# Patient Record
Sex: MALE | Race: WHITE | NOT HISPANIC OR LATINO | Employment: OTHER | URBAN - METROPOLITAN AREA
[De-identification: names, ages, dates, MRNs, and addresses within clinical notes are randomized per-mention and may not be internally consistent; named-entity substitution may affect disease eponyms.]

---

## 2023-03-07 ENCOUNTER — OFFICE VISIT (OUTPATIENT)
Dept: CARDIOLOGY | Facility: CLINIC | Age: 68
End: 2023-03-07
Payer: MEDICARE

## 2023-03-07 VITALS
OXYGEN SATURATION: 96 % | HEIGHT: 72 IN | WEIGHT: 195 LBS | BODY MASS INDEX: 26.41 KG/M2 | SYSTOLIC BLOOD PRESSURE: 122 MMHG | HEART RATE: 64 BPM | DIASTOLIC BLOOD PRESSURE: 76 MMHG

## 2023-03-07 DIAGNOSIS — I25.810 CORONARY ARTERY DISEASE INVOLVING CORONARY BYPASS GRAFT OF NATIVE HEART WITHOUT ANGINA PECTORIS: Primary | ICD-10-CM

## 2023-03-07 DIAGNOSIS — R06.02 SHORTNESS OF BREATH: ICD-10-CM

## 2023-03-07 DIAGNOSIS — E78.2 MIXED HYPERLIPIDEMIA: ICD-10-CM

## 2023-03-07 PROCEDURE — 99204 OFFICE O/P NEW MOD 45 MIN: CPT | Performed by: INTERNAL MEDICINE

## 2023-03-07 PROCEDURE — 93000 ELECTROCARDIOGRAM COMPLETE: CPT | Performed by: INTERNAL MEDICINE

## 2023-03-07 RX ORDER — MOMETASONE FUROATE 1 MG/ML
SOLUTION TOPICAL AS NEEDED
COMMUNITY

## 2023-03-07 RX ORDER — CITALOPRAM 40 MG/1
TABLET ORAL DAILY
COMMUNITY

## 2023-03-07 RX ORDER — OMEPRAZOLE 20 MG/1
CAPSULE, DELAYED RELEASE ORAL EVERY 24 HOURS
COMMUNITY

## 2023-03-07 RX ORDER — ATORVASTATIN CALCIUM 80 MG/1
TABLET, FILM COATED ORAL DAILY
COMMUNITY
Start: 2022-11-28 | End: 2023-03-07 | Stop reason: SDUPTHER

## 2023-03-07 RX ORDER — KETOCONAZOLE 20 MG/G
CREAM TOPICAL AS NEEDED
COMMUNITY

## 2023-03-07 RX ORDER — ATORVASTATIN CALCIUM 80 MG/1
80 TABLET, FILM COATED ORAL DAILY
Qty: 90 TABLET | Refills: 4 | Status: SHIPPED | OUTPATIENT
Start: 2023-03-07

## 2023-03-07 RX ORDER — CITALOPRAM 20 MG/1
TABLET ORAL DAILY
COMMUNITY

## 2023-03-07 RX ORDER — ASPIRIN 81 MG/1
TABLET ORAL EVERY 24 HOURS
COMMUNITY

## 2023-03-07 NOTE — PROGRESS NOTES
[facility] Cardiology  Consultation H&P  Chava Ritchie  1955  [phone]  There is no work phone number on file..    VISIT DATE:  03/07/2023    PCP: Vinicius King MD  [facility]  [facility] [address]    CC:  Chief Complaint   Patient presents with   • Atherosclerotic Heart Disease of Native CAD without angina        Previous cardiac studies and procedures:  June 2018: PCI distal LAD/first diagonal 2.25 x 16 and 3.5 x 22 Synergy.    September 2018 myocardial perfusion imaging: Mildly depressed EF.  Large infarct, territory unspecified.    June 2019  TTE: Normal EF.  Mild diastolic dysfunction.  Borderline LVH.  Mild MR.  Mild to moderate TR.  Normal PASP.  Aortic sclerosis.    ASSESSMENT:   Diagnosis Plan   1. Coronary artery disease involving coronary bypass graft of native heart without angina pectoris        2. Mixed hyperlipidemia              PLAN:  Coronary artery disease: Currently stable and asymptomatic.  Continue aspirin and high intensity statin therapy.  Echo pending to assess underlying LV systolic function.  If echo does not reveal any significant regions of scar and preserved EF then we will likely be able to wean off beta-blockade gradually.    Hyperlipidemia: Goal LDL less than 70.  Continue atorvastatin 80 mg p.o. daily.  Predominant plant-based diet.    History of Present Illness   67-year-old gentleman with a history of ST elevation myocardial infarction in 2018 treated with drug-eluting stenting.  Presented with atypical chest pain approximately 3 days into event.  No recurrence.  Blood pressures run less than 130/80 mmHg.  He is compliant with medical therapy.  Reports a relatively heart healthy diet.  Has intermittent shortness of breath and class II pattern.    PHYSICAL EXAMINATION:  Vitals:    03/07/23 1006   BP: 122/76   BP Location: Right arm   Patient Position: Sitting   Cuff Size: Adult   Pulse: 64   SpO2: 96%  "  Weight: 88.5 kg (195 lb)   Height: 182.9 cm (72\")     General Appearance:    Alert, cooperative, no distress, appears stated age   Head:    Normocephalic, without obvious abnormality, atraumatic   Eyes:    conjunctiva/corneas clear, EOM's intact, fundi     benign, both eyes   Ears:    Normal TM's and external ear canals, both ears   Nose:   Nares normal, septum midline, mucosa normal, no drainage    or sinus tenderness   Throat:   Lips, mucosa, and tongue normal; teeth and gums normal   Neck:   Supple, symmetrical, trachea midline, no adenopathy;     thyroid:  no enlargement/tenderness/nodules; no carotid    bruit or JVD   Back:     Symmetric, no curvature, ROM normal, no CVA tenderness   Lungs:     Clear to auscultation bilaterally, respirations unlabored   Chest Wall:    No tenderness or deformity    Heart:    Regular rate and rhythm, S1 and S2 normal, no murmur, rub   or gallop, normal carotid impulse bilaterally without bruit.   Abdomen:     Soft, non-tender, bowel sounds active all four quadrants,     no masses, no organomegaly   Extremities:   Extremities normal, atraumatic, no cyanosis or edema   Pulses:   2+ and symmetric all extremities   Skin:   Skin color, texture, turgor normal, no rashes or lesions   Lymph nodes:   Cervical, supraclavicular, and axillary nodes normal   Neurologic:   normal strength, sensation intact     throughout       Diagnostic Data:    ECG 12 Lead    Date/Time: 3/7/2023 10:21 AM  Performed by: Maximiliano Villagran III, MD  Authorized by: Maximiliano Villagran III, MD   Previous ECG: no previous ECG available  Rhythm: sinus rhythm  Conduction: left anterior fascicular block    Clinical impression: abnormal EKG          No results found for: CHLPL, TRIG, HDL, LDLDIRECT  No results found for: GLUCOSE, BUN, CREATININE, NA, K, CL, CO2, CREATININE, BUN  No results found for: HGBA1C  No results found for: WBC, HGB, HCT, PLT    PROBLEM LIST:  Patient Active Problem List   Diagnosis   • Coronary artery " disease involving coronary bypass graft of native heart without angina pectoris   • Mixed hyperlipidemia       PAST MEDICAL HX  Past Medical History:   Diagnosis Date   • Myocardial infarction (HCC) 2019    2 stints       Allergies  No Known Allergies    Current Medications    Current Outpatient Medications:   •  aspirin 81 MG EC tablet, Daily., Disp: , Rfl:   •  atorvastatin (LIPITOR) 80 MG tablet, Daily., Disp: , Rfl:   •  citalopram (CeleXA) 20 MG tablet, Daily., Disp: , Rfl:   •  citalopram (CeleXA) 40 MG tablet, Daily., Disp: , Rfl:   •  hydrocortisone 2.5 % cream, As Needed., Disp: , Rfl:   •  ketoconazole (NIZORAL) 2 % cream, As Needed., Disp: , Rfl:   •  Metoprolol Succinate 50 MG capsule extended-release 24 hour sprinkle, Daily., Disp: , Rfl:   •  mometasone (ELOCON) 0.1 % solution, As Needed., Disp: , Rfl:   •  omeprazole (priLOSEC) 20 MG capsule, Daily., Disp: , Rfl:          ROS  ROS      SOCIAL HX  Social History     Socioeconomic History   • Marital status:    Tobacco Use   • Smoking status: Former     Packs/day: 1.00     Years: 15.00     Pack years: 15.00     Types: Cigarettes     Quit date:      Years since quittin.1   • Smokeless tobacco: Never   • Tobacco comments:     Quit 18 years ago   Vaping Use   • Vaping Use: Never used   Substance and Sexual Activity   • Alcohol use: Yes     Alcohol/week: 6.0 standard drinks     Types: 2 Glasses of wine, 4 Cans of beer per week     Comment: Social drinker   • Drug use: Yes     Types: Marijuana   • Sexual activity: Not Currently     Partners: Male     Birth control/protection: None       FAMILY HX  Family History   Problem Relation Age of Onset   • Heart attack Brother    • No Known Problems Maternal Grandmother    • No Known Problems Maternal Grandfather    • No Known Problems Paternal Grandmother    • No Known Problems Paternal Grandfather              Maximiliano Villagran III, MD, [facility]

## 2023-04-19 ENCOUNTER — HOSPITAL ENCOUNTER (OUTPATIENT)
Dept: CARDIOLOGY | Facility: HOSPITAL | Age: 68
Discharge: HOME | End: 2023-04-19
Admitting: INTERNAL MEDICINE
Payer: MEDICARE

## 2023-04-19 VITALS — WEIGHT: 195 LBS | BODY MASS INDEX: 26.41 KG/M2 | HEIGHT: 72 IN

## 2023-04-19 DIAGNOSIS — R06.02 SHORTNESS OF BREATH: ICD-10-CM

## 2023-04-19 LAB
BH CV ECHO MEAS - AO MAX PG: 4.2 MMHG
BH CV ECHO MEAS - AO MEAN PG: 2.7 MMHG
BH CV ECHO MEAS - AO ROOT DIAM: 3 CM
BH CV ECHO MEAS - AO V2 MAX: 102.9 CM/SEC
BH CV ECHO MEAS - AO V2 VTI: 22.9 CM
BH CV ECHO MEAS - AVA(I,D): 2.09 CM2
BH CV ECHO MEAS - EDV(CUBED): 122.7 ML
BH CV ECHO MEAS - EDV(MOD-SP2): 113 ML
BH CV ECHO MEAS - EDV(MOD-SP4): 129 ML
BH CV ECHO MEAS - EF(MOD-BP): 50 %
BH CV ECHO MEAS - EF(MOD-SP2): 54.9 %
BH CV ECHO MEAS - EF(MOD-SP4): 50.4 %
BH CV ECHO MEAS - ESV(CUBED): 34.4 ML
BH CV ECHO MEAS - ESV(MOD-SP2): 51 ML
BH CV ECHO MEAS - ESV(MOD-SP4): 64 ML
BH CV ECHO MEAS - FS: 34.5 %
BH CV ECHO MEAS - IVS/LVPW: 0.92 CM
BH CV ECHO MEAS - IVSD: 1.02 CM
BH CV ECHO MEAS - LA DIMENSION: 4 CM
BH CV ECHO MEAS - LV DIASTOLIC VOL/BSA (35-75): 61.2 CM2
BH CV ECHO MEAS - LV MASS(C)D: 196.2 GRAMS
BH CV ECHO MEAS - LV MAX PG: 2.8 MMHG
BH CV ECHO MEAS - LV MEAN PG: 1.15 MMHG
BH CV ECHO MEAS - LV SYSTOLIC VOL/BSA (12-30): 30.4 CM2
BH CV ECHO MEAS - LV V1 MAX: 83.4 CM/SEC
BH CV ECHO MEAS - LV V1 VTI: 15.7 CM
BH CV ECHO MEAS - LVIDD: 5 CM
BH CV ECHO MEAS - LVIDS: 3.3 CM
BH CV ECHO MEAS - LVOT AREA: 3 CM2
BH CV ECHO MEAS - LVOT DIAM: 1.97 CM
BH CV ECHO MEAS - LVPWD: 1.11 CM
BH CV ECHO MEAS - MV A MAX VEL: 93.8 CM/SEC
BH CV ECHO MEAS - MV DEC SLOPE: 302.8 CM/SEC2
BH CV ECHO MEAS - MV DEC TIME: 0.29 MSEC
BH CV ECHO MEAS - MV E MAX VEL: 74 CM/SEC
BH CV ECHO MEAS - MV E/A: 0.79
BH CV ECHO MEAS - MV MAX PG: 5.6 MMHG
BH CV ECHO MEAS - MV MEAN PG: 2.39 MMHG
BH CV ECHO MEAS - MV P1/2T: 116.1 MSEC
BH CV ECHO MEAS - MV V2 VTI: 43 CM
BH CV ECHO MEAS - MVA(P1/2T): 1.89 CM2
BH CV ECHO MEAS - MVA(VTI): 1.11 CM2
BH CV ECHO MEAS - PA ACC SLOPE: 323 CM/SEC2
BH CV ECHO MEAS - PA ACC TIME: 0.16 SEC
BH CV ECHO MEAS - PA PR(ACCEL): 6.1 MMHG
BH CV ECHO MEAS - RAP SYSTOLE: 3 MMHG
BH CV ECHO MEAS - RVSP: 21 MMHG
BH CV ECHO MEAS - SI(MOD-SP2): 29.4 ML/M2
BH CV ECHO MEAS - SI(MOD-SP4): 30.8 ML/M2
BH CV ECHO MEAS - SV(LVOT): 47.7 ML
BH CV ECHO MEAS - SV(MOD-SP2): 62 ML
BH CV ECHO MEAS - SV(MOD-SP4): 65 ML
BH CV ECHO MEAS - TAPSE (>1.6): 2.3 CM
BH CV ECHO MEAS - TR MAX PG: 17.8 MMHG
BH CV ECHO MEAS - TR MAX VEL: 208.3 CM/SEC
BH CV VAS BP RIGHT ARM: NORMAL MMHG
BH CV XLRA - RV BASE: 3.6 CM
BH CV XLRA - RV LENGTH: 6.4 CM
BH CV XLRA - RV MID: 2.7 CM
BH CV XLRA - TDI S': 10.5 CM/SEC
LEFT ATRIUM VOLUME INDEX: 19.9 ML/M2
MAXIMAL PREDICTED HEART RATE: 151 BPM
STRESS TARGET HR: 128 BPM

## 2023-04-19 PROCEDURE — 93306 TTE W/DOPPLER COMPLETE: CPT | Performed by: INTERNAL MEDICINE

## 2023-04-19 PROCEDURE — 93306 TTE W/DOPPLER COMPLETE: CPT

## 2023-04-21 ENCOUNTER — TELEPHONE (OUTPATIENT)
Dept: CARDIOLOGY | Facility: CLINIC | Age: 68
End: 2023-04-21
Payer: MEDICARE

## 2023-04-21 NOTE — TELEPHONE ENCOUNTER
----- Message from Maximiliano Villagran III, MD sent at 4/21/2023  9:27 AM EDT -----  Strength of heart muscle normal.  Decrease metoprolol succinate to 25 mg p.o. daily.

## 2023-09-02 RX ORDER — METOPROLOL SUCCINATE 25 MG/1
TABLET, EXTENDED RELEASE ORAL
Qty: 30 TABLET | Refills: 5 | Status: SHIPPED | OUTPATIENT
Start: 2023-09-02

## 2024-03-12 ENCOUNTER — OFFICE VISIT (OUTPATIENT)
Dept: CARDIOLOGY | Facility: CLINIC | Age: 69
End: 2024-03-12
Payer: MEDICARE

## 2024-03-12 VITALS
HEIGHT: 72 IN | OXYGEN SATURATION: 98 % | BODY MASS INDEX: 26.14 KG/M2 | SYSTOLIC BLOOD PRESSURE: 124 MMHG | DIASTOLIC BLOOD PRESSURE: 68 MMHG | HEART RATE: 74 BPM | WEIGHT: 193 LBS

## 2024-03-12 DIAGNOSIS — I25.810 CORONARY ARTERY DISEASE INVOLVING CORONARY BYPASS GRAFT OF NATIVE HEART WITHOUT ANGINA PECTORIS: Primary | ICD-10-CM

## 2024-03-12 DIAGNOSIS — E78.2 MIXED HYPERLIPIDEMIA: ICD-10-CM

## 2024-03-12 PROCEDURE — 99214 OFFICE O/P EST MOD 30 MIN: CPT | Performed by: INTERNAL MEDICINE

## 2024-03-12 RX ORDER — ATORVASTATIN CALCIUM 80 MG/1
80 TABLET, FILM COATED ORAL DAILY
Qty: 90 TABLET | Refills: 4 | Status: SHIPPED | OUTPATIENT
Start: 2024-03-12

## 2024-03-12 RX ORDER — MULTIVIT WITH MINERALS/LUTEIN
1000 TABLET ORAL DAILY
COMMUNITY

## 2024-03-12 RX ORDER — DIPHENOXYLATE HYDROCHLORIDE AND ATROPINE SULFATE 2.5; .025 MG/1; MG/1
1 TABLET ORAL DAILY
COMMUNITY

## 2024-03-12 RX ORDER — METOPROLOL SUCCINATE 25 MG/1
25 TABLET, EXTENDED RELEASE ORAL DAILY
Qty: 90 TABLET | Refills: 3 | Status: SHIPPED | OUTPATIENT
Start: 2024-03-12

## 2024-03-12 NOTE — PROGRESS NOTES
[facility] Cardiology  Office visit  Chava Ritchie  1955  [phone]  There is no work phone number on file.    VISIT DATE:  3/12/2024    PCP: Ad Basurto MD  [address] [address]    CC:  Chief Complaint   Patient presents with    Coronary Artery Disease       Previous cardiac studies and procedures:  une 2018: PCI distal LAD/first diagonal 2.25 x 16 and 3.5 x 22 Synergy.    September 2018 myocardial perfusion imaging: Mildly depressed EF.  Large infarct, territory unspecified.    June 2019  TTE: Normal EF.  Mild diastolic dysfunction.  Borderline LVH.  Mild MR.  Mild to moderate TR.  Normal PASP.  Aortic sclerosis.    April 2023 TTE    Left ventricular systolic function is normal. Calculated left ventricular EF = 50% Left ventricular ejection fraction appears to be 56 - 60%.    Left ventricular diastolic function is consistent with (grade I) impaired relaxation.    Estimated right ventricular systolic pressure from tricuspid regurgitation is normal (<35 mmHg).    ASSESSMENT:   Diagnosis Plan   1. Coronary artery disease involving coronary bypass graft of native heart without angina pectoris        2. Mixed hyperlipidemia            PLAN:  Coronary artery disease: Currently stable and asymptomatic.  Continue aspirin, low-dose beta-blockade and high intensity statin therapy.       Hyperlipidemia: Goal LDL less than 70.  Continue atorvastatin 80 mg p.o. daily.  Predominant plant-based diet.  Well-controlled.    Subjective  Interval assessment: No change in baseline function capacity.  Denies chest pain, palpitations or dyspnea.  Blood pressures running less than 130/80 mmHg.    Initial evaluation: 67-year-old gentleman with a history of ST elevation myocardial infarction in 2018 treated with drug-eluting stenting. Presented with atypical chest pain approximately 3 days into event. No recurrence. Blood pressures run less than 130/80 mmHg. He is compliant  "with medical therapy. Reports a relatively heart healthy diet. Has intermittent shortness of breath and class II pattern.     PHYSICAL EXAMINATION:  Vitals:    03/12/24 1018   BP: 124/68   BP Location: Right arm   Patient Position: Sitting   Pulse: 74   SpO2: 98%   Weight: 87.5 kg (193 lb)   Height: 182.9 cm (72\")     General Appearance:    Alert, cooperative, no distress, appears stated age   Head:    Normocephalic, without obvious abnormality, atraumatic   Eyes:    conjunctiva/corneas clear   Nose:   Nares normal, septum midline, mucosa normal, no drainage   Throat:   Lips, teeth and gums normal   Neck:   Supple, symmetrical, trachea midline, no carotid    bruit or JVD   Lungs:     Clear to auscultation bilaterally, respirations unlabored   Chest Wall:    No tenderness or deformity    Heart:    Regular rate and rhythm, S1 and S2 normal, no murmur, rub   or gallop, normal carotid impulse bilaterally without bruit.   Abdomen:     Soft, non-tender   Extremities:   Extremities normal, atraumatic, no cyanosis or edema   Pulses:   2+ and symmetric all extremities   Skin:   Skin color, texture, turgor normal, no rashes or lesions       Diagnostic Data:  Procedures  No results found for: \"CHLPL\", \"TRIG\", \"HDL\", \"LDLDIRECT\"  No results found for: \"GLUCOSE\", \"BUN\", \"CREATININE\", \"NA\", \"K\", \"CL\", \"CO2\"  No results found for: \"HGBA1C\"  No results found for: \"WBC\", \"HGB\", \"HCT\", \"PLT\"    Allergies  No Known Allergies    Current Medications    Current Outpatient Medications:     ascorbic acid (VITAMIN C) 1000 MG tablet, Take 1 tablet by mouth Daily., Disp: , Rfl:     aspirin 81 MG EC tablet, Daily., Disp: , Rfl:     atorvastatin (LIPITOR) 80 MG tablet, Take 1 tablet by mouth Daily., Disp: 90 tablet, Rfl: 4    B Complex Vitamins (VITAMIN B COMPLEX PO), Take  by mouth Daily., Disp: , Rfl:     citalopram (CeleXA) 20 MG tablet, 4 (Four) Times a Week., Disp: , Rfl:     citalopram (CeleXA) 40 MG tablet, 3 (Three) Times a Week., Disp: " , Rfl:     hydrocortisone 2.5 % cream, As Needed., Disp: , Rfl:     ketoconazole (NIZORAL) 2 % cream, As Needed., Disp: , Rfl:     metoprolol succinate XL (TOPROL-XL) 25 MG 24 hr tablet, TAKE ONE TABLET BY MOUTH ONE TIME DAILY, Disp: 30 tablet, Rfl: 5    mometasone (ELOCON) 0.1 % solution, As Needed., Disp: , Rfl:     multivitamin (THERAGRAN) tablet tablet, Take 1 tablet by mouth Daily., Disp: , Rfl:     omeprazole (priLOSEC) 20 MG capsule, Daily., Disp: , Rfl:     VITAMIN D, CHOLECALCIFEROL, PO, Take 1 tablet by mouth Daily., Disp: , Rfl:           ROS  ROS      SOCIAL HX  Social History     Socioeconomic History    Marital status:    Tobacco Use    Smoking status: Former     Current packs/day: 0.00     Average packs/day: 1 pack/day for 15.0 years (15.0 ttl pk-yrs)     Types: Cigarettes     Start date: 1990     Quit date:      Years since quittin.2    Smokeless tobacco: Never    Tobacco comments:     Quit 18 years ago   Vaping Use    Vaping status: Never Used   Substance and Sexual Activity    Alcohol use: Yes     Alcohol/week: 6.0 standard drinks of alcohol     Types: 2 Glasses of wine, 4 Cans of beer per week     Comment: Social drinker    Drug use: Yes     Types: Marijuana    Sexual activity: Not Currently     Partners: Male     Birth control/protection: None       FAMILY HX  Family History   Problem Relation Age of Onset    Heart attack Brother     No Known Problems Maternal Grandmother     No Known Problems Maternal Grandfather     No Known Problems Paternal Grandmother     No Known Problems Paternal Grandfather              Maximiliano Villagran III, MD, [facility]

## 2024-04-16 ENCOUNTER — TELEPHONE (OUTPATIENT)
Dept: CARDIOLOGY | Facility: CLINIC | Age: 69
End: 2024-04-16

## 2024-04-16 NOTE — TELEPHONE ENCOUNTER
SPOKE W/PATIENT REGARDING LOCATION CHANGE TO [address] [place] [address]  4/16/24  HUB [facility]CH[facility] TO SCHEDULE

## 2025-03-25 ENCOUNTER — OFFICE VISIT (OUTPATIENT)
Dept: CARDIOLOGY | Facility: CLINIC | Age: 70
End: 2025-03-25
Payer: MEDICARE

## 2025-03-25 VITALS
HEART RATE: 70 BPM | OXYGEN SATURATION: 97 % | BODY MASS INDEX: 25.33 KG/M2 | WEIGHT: 187 LBS | DIASTOLIC BLOOD PRESSURE: 62 MMHG | HEIGHT: 72 IN | SYSTOLIC BLOOD PRESSURE: 118 MMHG

## 2025-03-25 DIAGNOSIS — I25.810 CORONARY ARTERY DISEASE INVOLVING CORONARY BYPASS GRAFT OF NATIVE HEART WITHOUT ANGINA PECTORIS: Primary | ICD-10-CM

## 2025-03-25 DIAGNOSIS — E78.2 MIXED HYPERLIPIDEMIA: ICD-10-CM

## 2025-03-25 PROCEDURE — 99214 OFFICE O/P EST MOD 30 MIN: CPT | Performed by: INTERNAL MEDICINE

## 2025-03-25 PROCEDURE — 93000 ELECTROCARDIOGRAM COMPLETE: CPT | Performed by: INTERNAL MEDICINE

## 2025-03-25 RX ORDER — METOPROLOL SUCCINATE 25 MG/1
25 TABLET, EXTENDED RELEASE ORAL DAILY
Qty: 90 TABLET | Refills: 3 | Status: SHIPPED | OUTPATIENT
Start: 2025-03-25

## 2025-03-25 RX ORDER — ATORVASTATIN CALCIUM 80 MG/1
80 TABLET, FILM COATED ORAL DAILY
Qty: 90 TABLET | Refills: 4 | Status: SHIPPED | OUTPATIENT
Start: 2025-03-25

## 2025-03-25 NOTE — PROGRESS NOTES
[facility] Cardiology  Office visit  Chava Ritchie  1955  [phone]  There is no work phone number on file.    VISIT DATE:  3/25/2025    PCP: Ad Basurto MD  [address] [address]    CC:  Chief Complaint   Patient presents with    Coronary Artery Disease     1 year follow up       Previous cardiac studies and procedures:  une 2018: PCI distal LAD/first diagonal 2.25 x 16 and 3.5 x 22 Synergy.    September 2018 myocardial perfusion imaging: Mildly depressed EF.  Large infarct, territory unspecified.    June 2019  TTE: Normal EF.  Mild diastolic dysfunction.  Borderline LVH.  Mild MR.  Mild to moderate TR.  Normal PASP.  Aortic sclerosis.    April 2023 TTE    Left ventricular systolic function is normal. Calculated left ventricular EF = 50% Left ventricular ejection fraction appears to be 56 - 60%.    Left ventricular diastolic function is consistent with (grade I) impaired relaxation.    Estimated right ventricular systolic pressure from tricuspid regurgitation is normal (<35 mmHg).    ASSESSMENT:   Diagnosis Plan   1. Coronary artery disease involving coronary bypass graft of native heart without angina pectoris        2. Mixed hyperlipidemia            PLAN:  Coronary artery disease: Currently stable and asymptomatic.  Continue aspirin, low-dose beta-blockade and high intensity statin therapy.       Hyperlipidemia: Goal LDL less than 70.  Continue atorvastatin 80 mg p.o. daily.  Predominant plant-based diet.  Well-controlled.    Diabetes mellitus: Most recent hemoglobin A1c 6.6.  Continue dietary modifications and regular exercise.  Continue routine follow-up with primary care physician for continued optimization of control.    Subjective  Interval assessment: No change in baseline function capacity.  Still exercising at the gym on a regular basis without difficulty.  Denies chest pain, palpitations or dyspnea.  Blood pressures running less than  "130/80 mmHg.    Initial evaluation: 67-year-old gentleman with a history of ST elevation myocardial infarction in 2018 treated with drug-eluting stenting. Presented with atypical chest pain approximately 3 days into event. No recurrence. Blood pressures run less than 130/80 mmHg. He is compliant with medical therapy. Reports a relatively heart healthy diet. Has intermittent shortness of breath and class II pattern.     PHYSICAL EXAMINATION:  Vitals:    03/25/25 0956   BP: 118/62   BP Location: Right arm   Patient Position: Sitting   Cuff Size: Adult   Pulse: 70   SpO2: 97%   Weight: 84.8 kg (187 lb)   Height: 182.9 cm (72\")     General Appearance:    Alert, cooperative, no distress, appears stated age   Head:    Normocephalic, without obvious abnormality, atraumatic   Eyes:    conjunctiva/corneas clear   Nose:   Nares normal, septum midline, mucosa normal, no drainage   Throat:   Lips, teeth and gums normal   Neck:   Supple, symmetrical, trachea midline, no carotid    bruit or JVD   Lungs:     Clear to auscultation bilaterally, respirations unlabored   Chest Wall:    No tenderness or deformity    Heart:    Regular rate and rhythm, S1 and S2 normal, no murmur, rub   or gallop, normal carotid impulse bilaterally without bruit.   Abdomen:     Soft, non-tender   Extremities:   Extremities normal, atraumatic, no cyanosis or edema   Pulses:   2+ and symmetric all extremities   Skin:   Skin color, texture, turgor normal, no rashes or lesions       Diagnostic Data:    ECG 12 Lead    Date/Time: 3/25/2025 10:17 AM  Performed by: Maximiliano Villagran III, MD    Authorized by: Maximiliano Villagran III, MD  Comparison: compared with previous ECG from 3/7/2023  Similar to previous ECG  Rhythm: sinus rhythm  Conduction: left anterior fascicular block    Clinical impression: abnormal EKG        No results found for: \"CHLPL\", \"TRIG\", \"HDL\", \"LDLDIRECT\"  No results found for: \"GLUCOSE\", \"BUN\", \"CREATININE\", \"NA\", \"K\", \"CL\", \"CO2\"  No results found " "for: \"HGBA1C\"  No results found for: \"WBC\", \"HGB\", \"HCT\", \"PLT\"    Allergies  No Known Allergies    Current Medications    Current Outpatient Medications:     ascorbic acid (VITAMIN C) 1000 MG tablet, Take 1 tablet by mouth Daily., Disp: , Rfl:     aspirin 81 MG EC tablet, Daily., Disp: , Rfl:     atorvastatin (LIPITOR) 80 MG tablet, Take 1 tablet by mouth Daily., Disp: 90 tablet, Rfl: 4    B Complex Vitamins (VITAMIN B COMPLEX PO), Take  by mouth Daily., Disp: , Rfl:     citalopram (CeleXA) 20 MG tablet, 4 (Four) Times a Week., Disp: , Rfl:     citalopram (CeleXA) 40 MG tablet, 3 (Three) Times a Week., Disp: , Rfl:     ketoconazole (NIZORAL) 2 % cream, As Needed., Disp: , Rfl:     metFORMIN (GLUCOPHAGE) 500 MG tablet, Take 1 tablet by mouth Daily With Breakfast., Disp: , Rfl:     metoprolol succinate XL (TOPROL-XL) 25 MG 24 hr tablet, Take 1 tablet by mouth Daily., Disp: 90 tablet, Rfl: 3    mometasone (ELOCON) 0.1 % solution, As Needed., Disp: , Rfl:     multivitamin (THERAGRAN) tablet tablet, Take 1 tablet by mouth Daily., Disp: , Rfl:     omeprazole (priLOSEC) 20 MG capsule, Daily., Disp: , Rfl:     VITAMIN D, CHOLECALCIFEROL, PO, Take 1 tablet by mouth Daily., Disp: , Rfl:           ROS  ROS      SOCIAL HX  Social History     Socioeconomic History    Marital status:    Tobacco Use    Smoking status: Former     Current packs/day: 0.00     Average packs/day: 1 pack/day for 15.0 years (15.0 ttl pk-yrs)     Types: Cigarettes     Start date: 1990     Quit date: 2005     Years since quittin.2    Smokeless tobacco: Never    Tobacco comments:     Quit 18 years ago   Vaping Use    Vaping status: Never Used   Substance and Sexual Activity    Alcohol use: Yes     Alcohol/week: 6.0 standard drinks of alcohol     Types: 2 Glasses of wine, 4 Cans of beer per week     Comment: Social drinker    Drug use: Yes     Types: Marijuana     Comment: ocass    Sexual activity: Not Currently     Partners: Male     Birth " control/protection: None       FAMILY HX  Family History   Problem Relation Age of Onset    Heart attack Brother     No Known Problems Maternal Grandmother     No Known Problems Maternal Grandfather     No Known Problems Paternal Grandmother     No Known Problems Paternal Grandfather              Maximiliano Villagran III, MD, FACC